# Patient Record
Sex: MALE | Race: WHITE | NOT HISPANIC OR LATINO | Employment: OTHER | URBAN - NONMETROPOLITAN AREA
[De-identification: names, ages, dates, MRNs, and addresses within clinical notes are randomized per-mention and may not be internally consistent; named-entity substitution may affect disease eponyms.]

---

## 2023-09-23 ENCOUNTER — OFFICE VISIT (OUTPATIENT)
Dept: URGENT CARE | Facility: CLINIC | Age: 73
End: 2023-09-23
Payer: MEDICARE

## 2023-09-23 VITALS
HEART RATE: 61 BPM | RESPIRATION RATE: 18 BRPM | DIASTOLIC BLOOD PRESSURE: 88 MMHG | TEMPERATURE: 97.1 F | SYSTOLIC BLOOD PRESSURE: 158 MMHG | OXYGEN SATURATION: 95 %

## 2023-09-23 DIAGNOSIS — R05.3 CHRONIC COUGH: Primary | ICD-10-CM

## 2023-09-23 DIAGNOSIS — R06.2 WHEEZING: ICD-10-CM

## 2023-09-23 PROCEDURE — 99203 OFFICE O/P NEW LOW 30 MIN: CPT

## 2023-09-23 PROCEDURE — G0463 HOSPITAL OUTPT CLINIC VISIT: HCPCS

## 2023-09-23 RX ORDER — PANTOPRAZOLE SODIUM 40 MG/1
TABLET, DELAYED RELEASE ORAL
COMMUNITY
Start: 2023-08-04

## 2023-09-23 RX ORDER — LORATADINE 10 MG/1
10 CAPSULE, LIQUID FILLED ORAL DAILY
COMMUNITY

## 2023-09-23 RX ORDER — TAMSULOSIN HYDROCHLORIDE 0.4 MG/1
CAPSULE ORAL
COMMUNITY
Start: 2023-08-04

## 2023-09-23 RX ORDER — VALSARTAN AND HYDROCHLOROTHIAZIDE 80; 12.5 MG/1; MG/1
TABLET, FILM COATED ORAL DAILY
COMMUNITY

## 2023-09-23 RX ORDER — METHYLPREDNISOLONE 4 MG/1
TABLET ORAL
Qty: 21 TABLET | Refills: 0 | Status: SHIPPED | OUTPATIENT
Start: 2023-09-23

## 2023-09-23 RX ORDER — AZITHROMYCIN 250 MG/1
TABLET, FILM COATED ORAL
Qty: 6 TABLET | Refills: 0 | Status: SHIPPED | OUTPATIENT
Start: 2023-09-23 | End: 2023-09-27

## 2023-09-23 RX ORDER — FLUTICASONE PROPIONATE 110 UG/1
2 AEROSOL, METERED RESPIRATORY (INHALATION) 2 TIMES DAILY
COMMUNITY
Start: 2023-07-17

## 2023-09-23 RX ORDER — ALLOPURINOL 300 MG/1
TABLET ORAL
COMMUNITY
Start: 2023-08-04

## 2023-09-23 RX ORDER — TIMOLOL MALEATE 5 MG/ML
SOLUTION/ DROPS OPHTHALMIC
COMMUNITY
Start: 2023-09-11

## 2023-09-23 RX ORDER — BISOPROLOL FUMARATE AND HYDROCHLOROTHIAZIDE 2.5; 6.25 MG/1; MG/1
2 TABLET ORAL DAILY
COMMUNITY
Start: 2023-09-12

## 2023-09-23 RX ORDER — ASCORBIC ACID 250 MG
500 TABLET ORAL DAILY
COMMUNITY

## 2023-09-23 RX ORDER — SIMVASTATIN 40 MG
TABLET ORAL
COMMUNITY
Start: 2023-08-04

## 2023-09-23 RX ORDER — CELECOXIB 200 MG/1
1 CAPSULE ORAL 2 TIMES DAILY
COMMUNITY
Start: 2023-09-12

## 2023-09-23 NOTE — PROGRESS NOTES
[facility]        NAME: Humera Avalos is a 68 y.o. male  : 1950    MRN: [id number]  DATE: 2023  TIME: 2:18 PM    Assessment and Plan   Chronic cough [R05.3]  1. Chronic cough  methylPREDNISolone 4 MG tablet therapy pack    azithromycin (ZITHROMAX) 250 mg tablet      2. Wheezing  methylPREDNISolone 4 MG tablet therapy pack        Persistent cough that is worse in the morning x1 month. There is mild expiratory wheezing heard bilaterally diffusely. PT using Flovent inhaler at home which is very helpful for him. Starting on Z-Hugo and Medrol Dosepak. Family doctor appointment coming up in 2 weeks. Advised follow-up with family doctor. Advised with ER symptoms worsen. Patient Instructions     Take prescribed medication as instructed. Tylenol for pain or fever. May try warm tea with honey, teaspoon of honey, throat lozenges to help with coughing irritation. Cough may last for 4 to 6 weeks before improvement. Continue with Flovent inhaler. Follow-up with your family doctor appointment that is scheduled in 2 weeks. Go to the emergency room if any symptoms worsen such as shortness of breath or chest pain. Follow up with PCP in 3-5 days. Proceed to  ER if symptoms worsen. Chief Complaint     Chief Complaint   Patient presents with   • Cough     Dry Cough with occasional mucous started a month ago that hurts into the chest and into the back. Taking inhaler and spray. Does get some relief. No other symptoms reported         History of Present Illness       70-year-old male here with wife for persistent cough going on for 1 week. Denies history of asthma or COPD-states that he was told he had a lung issue many years ago but cannot remember what it is. He has prescribed Flovent inhaler, which he stated is very helpful for him. Admits to occasional nasal congestion in the morning, but this is not daily. Cough is worse in the morning.   Denies any fever, chills, chest pain, shortness of breath, abdominal pain, nausea, vomiting, diarrhea. Denies sick contacts. Review of Systems   Review of Systems   Constitutional: Negative. HENT: Positive for congestion (Occasionally). Negative for ear discharge, ear pain and sore throat. Eyes: Negative. Respiratory: Positive for cough. Negative for shortness of breath and wheezing. Cardiovascular: Negative. Gastrointestinal: Negative. Musculoskeletal: Negative. Skin: Negative. Neurological: Negative.           Current Medications       Current Outpatient Medications:   •  allopurinol (ZYLOPRIM) 300 mg tablet, , Disp: , Rfl:   •  ascorbic acid (VITAMIN C) 250 MG tablet, Take 500 mg by mouth daily, Disp: , Rfl:   •  ASPIRIN 81 PO, Take by mouth daily, Disp: , Rfl:   •  azithromycin (ZITHROMAX) 250 mg tablet, Take 2 tablets today then 1 tablet daily x 4 days, Disp: 6 tablet, Rfl: 0  •  bisoprolol-hydrochlorothiazide (ZIAC) 2.5-6.25 MG per tablet, Take 2 tablets by mouth daily, Disp: , Rfl:   •  celecoxib (CeleBREX) 200 mg capsule, Take 1 capsule by mouth 2 (two) times a day, Disp: , Rfl:   •  Cholecalciferol 10 MCG (400 UNIT) CHEW, Chew 1 tablet daily, Disp: , Rfl:   •  fluticasone (FLOVENT HFA) 110 MCG/ACT inhaler, Inhale 2 puffs 2 (two) times a day, Disp: , Rfl:   •  Loratadine 10 MG CAPS, Take 10 mg by mouth daily, Disp: , Rfl:   •  methylPREDNISolone 4 MG tablet therapy pack, Use as directed on package, Disp: 21 tablet, Rfl: 0  •  pantoprazole (PROTONIX) 40 mg tablet, , Disp: , Rfl:   •  simvastatin (ZOCOR) 40 mg tablet, , Disp: , Rfl:   •  tamsulosin (FLOMAX) 0.4 mg, , Disp: , Rfl:   •  timolol (TIMOPTIC) 0.5 % ophthalmic solution, , Disp: , Rfl:   •  valsartan-hydrochlorothiazide (Diovan HCT) 80-12.5 MG per tablet, Take by mouth daily, Disp: , Rfl:     Current Allergies     Allergies as of 09/23/2023 - never reviewed   Allergen Reaction Noted   • Latex Rash 05/24/2021   • Penicillins Hives and Rash 11/16/2005   • Medical tape Rash 07/03/2012   • Oxycodone Rash 07/01/2021            The following portions of the patient's history were reviewed and updated as appropriate: allergies, current medications, past family history, past medical history, past social history, past surgical history and problem list.     Past Medical History:   Diagnosis Date   • Gout    • Hypercholesteremia    • Hypertension    • Prostate disorder        History reviewed. No pertinent surgical history. No family history on file. Medications have been verified. Objective   /88 (BP Location: Right arm)   Pulse 61   Temp (!) 97.1 °F (36.2 °C) (Tympanic)   Resp 18   SpO2 95%        Physical Exam     Physical Exam  Constitutional:       General: He is not in acute distress. Appearance: Normal appearance. He is not ill-appearing. HENT:      Head: Normocephalic and atraumatic. Nose: No congestion or rhinorrhea. Mouth/Throat:      Mouth: Mucous membranes are moist.      Pharynx: Oropharynx is clear. No oropharyngeal exudate or posterior oropharyngeal erythema. Eyes:      Extraocular Movements: Extraocular movements intact. Pupils: Pupils are equal, round, and reactive to light. Cardiovascular:      Rate and Rhythm: Normal rate and regular rhythm. Pulses: Normal pulses. Heart sounds: Normal heart sounds. Pulmonary:      Effort: Pulmonary effort is normal. No respiratory distress. Breath sounds: No stridor. Wheezing (Very mild diffuse expiratory wheezing heard bilaterally.) present. No rhonchi or rales. Chest:      Chest wall: No tenderness. Musculoskeletal:      Cervical back: Normal range of motion and neck supple. Skin:     General: Skin is warm and dry. Capillary Refill: Capillary refill takes less than 2 seconds. Findings: No rash. Neurological:      General: No focal deficit present. Mental Status: He is alert and oriented to person, place, and time.  Mental status is at baseline.    Psychiatric:         Mood and Affect: Mood normal.         Behavior: Behavior normal.

## 2023-09-23 NOTE — PATIENT INSTRUCTIONS
Take prescribed medication as instructed. Tylenol for pain or fever. May try warm tea with honey, teaspoon of honey, throat lozenges to help with coughing irritation. Cough may last for 4 to 6 weeks before improvement. Continue with Flovent inhaler. Follow-up with your family doctor appointment that is scheduled in 2 weeks. Go to the emergency room if any symptoms worsen such as shortness of breath or chest pain. Follow up with PCP in 3-5 days. Proceed to  ER if symptoms worsen. Chronic Cough   WHAT YOU NEED TO KNOW:   A chronic cough is a cough that lasts more than 4 weeks in children or 8 weeks in adults. DISCHARGE INSTRUCTIONS:   Call your local emergency number ([phone] in the [address]) for any of the following: You cough up blood. You faint when you cough. You have trouble breathing. Call your doctor if:   You have new or worsening symptoms. You have severe pain when you take a deep breath. You become very tired after a coughing fit. You have trouble sleeping because of the coughing. You have questions or concerns about your condition or care. Medicines:   Medicines  may be needed to stop your cough. You may also need medicine to treat allergies or acid reflux, or decrease swelling in your airways. If you have a respiratory infection, you may need antibiotics. Medicine may be given as a pill or to use in an inhaler. Take your medicine as directed. Contact your healthcare provider if you think your medicine is not helping or if you have side effects. Tell your provider if you are allergic to any medicine. Keep a list of the medicines, vitamins, and herbs you take. Include the amounts, and when and why you take them. Bring the list or the pill bottles to follow-up visits. Carry your medicine list with you in case of an emergency. Self-care:   Prevent acid reflex. Acid reflux can make your chronic cough worse. Raise your head and upper back when you sleep.  Place 2 or more pillows behind your head or sleep in a recliner. Do not lie down for at least 1 hour after you eat. Do not have foods or drinks that increase heartburn. Ask your healthcare provider for other ways to prevent acid reflux. Do not smoke. Encourage your adolescent child not to smoke. Nicotine and other chemicals in cigarettes and cigars can cause lung damage. They can also make your cough worse. Ask your healthcare provider for information if you currently smoke and need help to quit. E-cigarettes or smokeless tobacco still contain nicotine. Talk to your healthcare provider before you use these products. Stay away from secondhand smoke. Do not let people smoke in your car, home, or near your child. Do not stand near someone that is smoking. This includes anyone that is smoking an E-cigarrete. Avoid anything that triggers your allergies or irritates your throat. Allergens and irritants can make your chronic cough worse. Allergens may include dust mites, pollen, pet dander, or mold. Wear a mask if you work around pollutants or irritants. Ask your healthcare provider for more ways to decrease your exposure to allergens or irritants. Drink plenty of liquids as directed. Liquids may help relieve throat discomfort that causes you to cough. Add honey to tea or hot water to help ease your throat pain. Ask how much liquid to drink each day and which liquids are best for you. Follow up with your healthcare provider as directed: You may need to return for more tests. Your healthcare provider may refer to you other specialists. Write down your questions so you remember to ask them during your visits. © Copyright Gisela Delacruz 2023 Information is for End User's use only and may not be sold, redistributed or otherwise used for commercial purposes. The above information is an  only. It is not intended as medical advice for individual conditions or treatments.  Talk to your doctor, nurse or pharmacist before following any medical regimen to see if it is safe and effective for you. Wheezing   WHAT YOU NEED TO KNOW:   Wheezing happens when air flows through a narrowed or blocked airway. Wheezing can happen when you breathe in, breathe out, or both. Wheezes may sound like a whistle, squeal, groan, or creak. Wheezes may also sound musical or high-pitched. DISCHARGE INSTRUCTIONS:   Call your local emergency number (911 in the [address]) if:   You feel lightheaded, short of breath, and have chest pain. You are dizzy, confused, or feel faint. You have sudden trouble breathing. Your throat feels like it is swelling or feels tight. Return to the emergency department if:   You cough up blood. Call your doctor if:   You have a fever. Your wheezing does not get better or it gets worse. You have questions or concerns about your condition or care. Medicines:   Medicines  may help open your airways, decrease your symptoms, or treat an infection. They may be given as an inhaler, nebulizer, or pill. Take your medicine as directed. Contact your healthcare provider if you think your medicine is not helping or if you have side effects. Tell your provider if you are allergic to any medicine. Keep a list of the medicines, vitamins, and herbs you take. Include the amounts, and when and why you take them. Bring the list or the pill bottles to follow-up visits. Carry your medicine list with you in case of an emergency. Self care:   Return to your usual activity as directed. You may need to limit certain activities. Ask your healthcare provider when it is okay to resume activity. Take deep breaths and cough several times a day. This will decrease your risk for a lung infection and help decrease wheezing. Take a deep breath and hold it for as long as you can. Let the air out and then cough strongly. Deep breaths help open your airway.  You may be given an incentive spirometer to help you take deep breaths. Put the plastic piece in your mouth and take a slow, deep breath in, then let the air out and cough. Repeat these steps 10 times every hour. Drink liquids as directed. You may need to drink more liquids than usual to thin your mucus and prevent dehydration. Ask how much liquid to drink each day and which liquids are best for you. Prevent wheezing:   Do not smoke. Nicotine and other chemicals in cigarettes and cigars can cause lung damage. Ask your healthcare provider for information if you currently smoke and need help to quit. E-cigarettes or smokeless tobacco still contain nicotine. Talk to your healthcare provider before you use these products. Avoid allergy triggers , such as animals, grass, pollen, or dust.    Follow up with your doctor as directed: You may be referred to a specialist. Write down your questions so you remember to ask them during your visits. © Copyright Dillsborokeyana Nolasco 2023 Information is for End User's use only and may not be sold, redistributed or otherwise used for commercial purposes. The above information is an  only. It is not intended as medical advice for individual conditions or treatments. Talk to your doctor, nurse or pharmacist before following any medical regimen to see if it is safe and effective for you.